# Patient Record
Sex: FEMALE | Race: WHITE
[De-identification: names, ages, dates, MRNs, and addresses within clinical notes are randomized per-mention and may not be internally consistent; named-entity substitution may affect disease eponyms.]

---

## 2021-01-08 ENCOUNTER — APPOINTMENT (OUTPATIENT)
Dept: DERMATOLOGY | Facility: CLINIC | Age: 36
End: 2021-01-08
Payer: MEDICAID

## 2021-01-08 DIAGNOSIS — L70.0 ACNE VULGARIS: ICD-10-CM

## 2021-01-08 PROCEDURE — 99072 ADDL SUPL MATRL&STAF TM PHE: CPT

## 2021-01-08 PROCEDURE — 99204 OFFICE O/P NEW MOD 45 MIN: CPT

## 2021-01-08 RX ORDER — CLONAZEPAM 2 MG/1
TABLET ORAL
Refills: 0 | Status: ACTIVE | COMMUNITY

## 2021-01-08 RX ORDER — LEVOMEFOLATE/ALGAL OIL 15-90.314
CAPSULE ORAL
Refills: 0 | Status: ACTIVE | COMMUNITY

## 2021-01-08 RX ORDER — CLINDAMYCIN PHOSPHATE 1 G/10ML
1 GEL TOPICAL TWICE DAILY
Qty: 1 | Refills: 5 | Status: ACTIVE | COMMUNITY
Start: 2021-01-08 | End: 1900-01-01

## 2021-01-08 RX ORDER — DOXYCYCLINE HYCLATE 100 MG/1
100 CAPSULE ORAL TWICE DAILY
Qty: 60 | Refills: 2 | Status: ACTIVE | COMMUNITY
Start: 2021-01-08 | End: 1900-01-01

## 2021-01-08 NOTE — PHYSICAL EXAM
[FreeTextEntry3] : moderate inflammatory acne;  mostly perioral, jawlines,\par some pitting; \par no large cysts

## 2021-01-08 NOTE — HISTORY OF PRESENT ILLNESS
[de-identified] : Pt. c/o acne on face;  worse over last 6 months;  \par Hx severe acne in teens; \par has used OTC BPO recently;   ATB in past;

## 2021-01-08 NOTE — HISTORY OF PRESENT ILLNESS
[de-identified] : Pt. c/o acne on face;  worse over last 6 months;  \par Hx severe acne in teens; \par has used OTC BPO recently;   ATB in past;

## 2021-01-08 NOTE — ASSESSMENT
[FreeTextEntry1] : Moderate inflammatory acne;  likely hormonal component; \par \par Therapeutic options and their risks and benefits; along with multiple diagnostic possibilities were discussed at length; risks and benefits of further study were discussed;\par \par Recommend spirono, but pt. wishes to discuss with endocrinologist first, appt. in Feb; \par \par Start with clinda gel BID (may add back OTC BPO)\par doxy 100 BID; has taken before - Risks and benefits of doxycycline were discussed including GI upset, photosensitivity; \par f/u 6-8 wks;  t/c switch to spirono if OK with Endo